# Patient Record
Sex: FEMALE | Race: WHITE | NOT HISPANIC OR LATINO | Employment: OTHER | URBAN - METROPOLITAN AREA
[De-identification: names, ages, dates, MRNs, and addresses within clinical notes are randomized per-mention and may not be internally consistent; named-entity substitution may affect disease eponyms.]

---

## 2024-06-20 DIAGNOSIS — G51.39 HEMIFACIAL SPASM: Primary | ICD-10-CM

## 2024-07-23 ENCOUNTER — OFFICE VISIT (OUTPATIENT)
Dept: NEUROLOGY | Facility: CLINIC | Age: 69
End: 2024-07-23
Payer: MEDICARE

## 2024-07-23 DIAGNOSIS — H53.2 DIPLOPIA: ICD-10-CM

## 2024-07-23 DIAGNOSIS — G51.39 HEMIFACIAL SPASM: Primary | ICD-10-CM

## 2024-07-23 PROCEDURE — 99204 OFFICE O/P NEW MOD 45 MIN: CPT | Mod: 95,,, | Performed by: SPECIALIST

## 2024-07-23 NOTE — PROGRESS NOTES
This is a telemedicine note. See bottom of note for boilerplate elements.     Alba Rogers is a 68 y.o. female seen today via telemedicine visit.   Subjective:    Patient ID: Alba Rogers is a 68 y.o. female.  Chief Complaint: virtual follow up for dx or symptoms: new virtual visit R hf spasm     HPI:         2 years ago in the same week she developed double vision and right-sided facial twitching.  The facial twitching would ultimately close both eyes and she had to stop driving for a while.      Luckily, her daughter is a nurse practitioner and has been giving her Botox in the right face for about a year and this has allowed her to resume driving.      She has prism in her right lens which helped with the double vision.    She has never had a brain imaging study/MRI    No current outpatient medications     Objective:      Exam Limited due to telemedicine restrictions.  There were no vitals taken for this visit.  if accompanied, by:_ no one  Speech:  Normal  Face:  Asymmetric from the prior Botox but mildly so  Eye movements are not discernibly abnormal; no overt ptosis  Speech and voice seemed normal    Neuroimaging:  []Images and imaging reports reviewed.  My comments:     Labs:    meds:      __; _ multiple issues/ diagnoses or problems [if not enumerated in note then discussed in encounter but not documented]    complexity of data     __high ; _mod   __ images and reports reviewed __ hx obtained from family or accompaniment _; _studies ordered __   __studies considered or discussed but not ordered __ _; _DDx discussed    Risks    __high ; _mod   __ (possible or definite) neurodegenerative condition and inherent progression  __ (poss or def) autoimmune condition with possibility of flares or unexpected attack  __ (poss or def) seiz d.o. with possib of recurr seiz's   __ cerebrovasc ds with risk of recurrence of stroke  __ CNS meds (and/or) potentially high risk non CNS meds which may cause  medical or behavioral side effects  __ fall risk  __ driving discussed __ diagnosis unclear or DDx wide making risk uncertain to high  __other:    MDM/Medical Decision Making     __high  ; _moderate   Assessment/Plan:     Problem List Items Addressed This Visit          Neuro    Hemifacial spasm - Primary     Other Visit Diagnoses       Diplopia                Other comments/ follow up:    Imaging orders (if any):   Orders Placed This Encounter   Procedures    MRI Brain W WO Contrast    Creatinine, serum    Prior authorization Order           Aim to visit face-to-face for Botox injections in October  Video Time Documentation:  Spent 13 minutes with patient over video discussing health concerns.   Total time this visit:     18 minutes    This is a telemedicine note.   Patient was treated using telemedicine, real time audio and video, according to [facility] protocols. This visit is not recorded.  ISedrick MD, conducted the visit from the Neurology clinic of [facility]. The patient participated in the visit at a non-[facility] location selected by the patient, [facility].   I am licensed in [place] where the patient stated they are located. The patient stated that they understood and accepted the privacy and security risks to their information at their location.

## 2024-08-07 ENCOUNTER — TELEPHONE (OUTPATIENT)
Dept: NEUROLOGY | Facility: CLINIC | Age: 69
End: 2024-08-07
Payer: MEDICARE